# Patient Record
Sex: FEMALE | Race: WHITE
[De-identification: names, ages, dates, MRNs, and addresses within clinical notes are randomized per-mention and may not be internally consistent; named-entity substitution may affect disease eponyms.]

---

## 2018-01-26 ENCOUNTER — HOSPITAL ENCOUNTER (EMERGENCY)
Dept: HOSPITAL 17 - PHEFT | Age: 83
Discharge: HOME | End: 2018-01-26
Payer: MEDICARE

## 2018-01-26 VITALS — HEIGHT: 62 IN | BODY MASS INDEX: 21.54 KG/M2 | WEIGHT: 117.07 LBS

## 2018-01-26 VITALS
SYSTOLIC BLOOD PRESSURE: 122 MMHG | RESPIRATION RATE: 16 BRPM | DIASTOLIC BLOOD PRESSURE: 56 MMHG | HEART RATE: 63 BPM | OXYGEN SATURATION: 97 % | TEMPERATURE: 97.7 F

## 2018-01-26 DIAGNOSIS — Z79.82: ICD-10-CM

## 2018-01-26 DIAGNOSIS — Z95.810: ICD-10-CM

## 2018-01-26 DIAGNOSIS — I50.9: ICD-10-CM
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
## 2018-01-26 DIAGNOSIS — I11.0: ICD-10-CM

## 2018-01-26 DIAGNOSIS — S63.601A: Primary | ICD-10-CM

## 2018-01-26 DIAGNOSIS — Z79.899: ICD-10-CM

## 2018-01-26 PROCEDURE — 99283 EMERGENCY DEPT VISIT LOW MDM: CPT

## 2018-01-26 PROCEDURE — 29130 APPL FINGER SPLINT STATIC: CPT

## 2018-01-26 PROCEDURE — 73130 X-RAY EXAM OF HAND: CPT

## 2018-01-26 PROCEDURE — L3808 WHFO, RIGID W/O JOINTS: HCPCS

## 2018-01-26 NOTE — PD
HPI


Chief Complaint:  Musculoskeletal Complaint


Time Seen by Provider:  10:42


Travel History


International Travel<30 days:  No


Contact w/Intl Traveler<30days:  No


Traveled to known affect area:  No





History of Present Illness


HPI


This is an 85-year-old female here with right thumb pain, swelling, ecchymosis 

after a trip and fall yesterday evening.  Denies head injury or loss of 

consciousness.  She reports decreased range of motion due to pain and swelling.

  Denies paresthesia or weakness of the digit.  Symptoms severity is moderate.  

Aggravated by movement and relieved with rest.  He denies any other injuries.  

No headache, neck pain, chest pain, shortness of breath, abdominal pain.





PFSH


Past Medical History


Hx Anticoagulant Therapy:  Yes (ASA 81MG DAILY)


Congestive Heart Failure:  Yes


Hypertension:  Yes


Tetanus Vaccination:  > 5 Years


Influenza Vaccination:  Yes


Pregnant?:  Not Pregnant


Menopausal:  Yes





Past Surgical History


Cardiac Surgery:  Yes (Defib)


Eye Surgery:  Yes


Pacemaker:  Yes (Defib)





Social History


Alcohol Use:  No


Tobacco Use:  No


Substance Use:  No





Allergies-Medications


(Allergen,Severity, Reaction):  


Coded Allergies:  


     No Known Allergies (Unverified , 1/26/18)


Reported Meds & Prescriptions





Reported Meds & Active Scripts


Active


Reported


Pepcid (Famotidine) 40 Mg Tab 40 Mg PO HS


Aspirin 81 Mg Chew 81 Mg CHEW DAILY


Furosemide 20 Mg Tab 20 Mg PO DAILY


Ramipril 10 Mg Cap 10 Mg PO DAILY


Carvedilol 12.5 Mg Tab 12.5 Mg PO BID








Review of Systems


Except as stated in HPI:  all other systems reviewed are Neg





Physical Exam


Narrative


GENERAL: Alert and well-appearing 84-year-old female


SKIN: Warm and dry.


HEAD: Normocephalic.  Atraumatic


EYES:  No injection or drainage. 


NECK: Supple, trachea midline. No cervical midline tenderness.


CARDIOVASCULAR: Regular rate and rhythm 


RESPIRATORY: Breath sounds equal bilaterally. No accessory muscle use.


GASTROINTESTINAL: Abdomen soft, non-tender, nondistended. 


MUSCULOSKELETAL: No cyanosis.  Right upper extremity: Notable tenderness, 

swelling, ecchymosis to the entire right thumb.  No deformity.  There is a 

small abrasion to the nail fold medial aspect. No evidence of infection.  

Limited flexion due to swelling and pain.  Normal sensation.  Brisk cap refill.


BACK: Nontender. No CVA tenderness.








Data


Data


Last Documented VS





Vital Signs








  Date Time  Temp Pulse Resp B/P (MAP) Pulse Ox O2 Delivery O2 Flow Rate FiO2


 


1/26/18 10:20 97.7 63 16 122/56 (78) 97   








Orders





 Orders


Hand, Complete ([id number]) (1/26/18 )


Ed Discharge Order (1/26/18 11:40)


Tetanus/Diphtheria Tox Adult (Tetanus/Di (1/26/18 11:45)


Splint Or Brace Apply/Monitor (1/26/18 11:40)


Fiberglass Thumb Spica Adult (1/26/18 )








MDM


Medical Decision Making


Medical Screen Exam Complete:  Yes


Emergency Medical Condition:  Yes


Differential Diagnosis


Thumb fracture, sprain, dislocation


Narrative Course


This is an 85-year-old female here with right thumb pain, swelling, ecchymosis 

after a trip and fall yesterday evening.  The digit is neurovascularly intact.  

She has a small abrasion to the lateral aspect of the nail.  No evidence of 

infection.  X-ray was negative for fracture and dislocation.  Her tetanus 

immunization was updated.  Finger splint applied.  Wound care discussed.  

Patient is to follow up with her primary doctor and was also given a referral 

to hand surgeon should she have any complications.





Diagnosis





 Primary Impression:  


 Thumb sprain


 Qualified Codes:  S63.601A - Unspecified sprain of right thumb, initial 

encounter


Referrals:  


Debo Menendez MD





Primary Care Physician





***Additional Instructions:  


Where the thumb splint daily for one week.


Cleanse the wound with soap and water each day.


Apply a thin layer of antibiotic ointment and redressed with a dry dressing.


Tylenol or ibuprofen as needed for discomfort.


Follow-up with her primary doctor for recheck.


Disposition:  01 DISCHARGE HOME


Condition:  Stable











Oumou Biggs ALEXANDRA MEADOWS Jan 26, 2018 11:40

## 2018-01-26 NOTE — RADRPT
EXAM DATE/TIME:  01/26/2018 11:09 

 

[facility] COMPARISON:     

No previous studies available for comparison.

 

                     

INDICATIONS :     

Fall, right thumb pain.

                     

 

MEDICAL HISTORY :     

None.          

 

SURGICAL HISTORY :     

None.   

 

ENCOUNTER:     

Initial                                        

 

ACUITY:     

2 days      

 

PAIN SCORE:     

7/10

 

LOCATION:     

Right  thumb

 

FINDINGS:     

Extensive degenerative changes are seen base of the thumb involving the trapezium and the first metac
arpal.  Alignment anatomic.  Fracture is not appreciated.

 

CONCLUSION:     Degenerative changes without fracture.  

 

 

 Bruce Coelho MD FACR on January 26, 2018 at 11:28           

Board Certified Radiologist.

 This report was verified electronically.